# Patient Record
Sex: FEMALE | Race: BLACK OR AFRICAN AMERICAN | NOT HISPANIC OR LATINO | Employment: UNEMPLOYED | URBAN - METROPOLITAN AREA
[De-identification: names, ages, dates, MRNs, and addresses within clinical notes are randomized per-mention and may not be internally consistent; named-entity substitution may affect disease eponyms.]

---

## 2019-06-10 ENCOUNTER — TELEPHONE (OUTPATIENT)
Dept: ORTHOPEDICS | Facility: CLINIC | Age: 64
End: 2019-06-10

## 2019-06-10 DIAGNOSIS — M25.552 LEFT HIP PAIN: Primary | ICD-10-CM

## 2019-06-10 NOTE — TELEPHONE ENCOUNTER
Patient contacted in reference to her consult appointment with the provider on 6/11/19. Clarity on which hip will need xrays. Patient stated it is her left hip that is causing the problem.//DG

## 2019-06-11 ENCOUNTER — OFFICE VISIT (OUTPATIENT)
Dept: ORTHOPEDICS | Facility: CLINIC | Age: 64
End: 2019-06-11
Payer: MEDICARE

## 2019-06-11 ENCOUNTER — HOSPITAL ENCOUNTER (OUTPATIENT)
Dept: RADIOLOGY | Facility: HOSPITAL | Age: 64
Discharge: HOME | End: 2019-06-11
Attending: ORTHOPAEDIC SURGERY
Payer: MEDICARE

## 2019-06-11 VITALS
HEIGHT: 66 IN | HEART RATE: 71 BPM | SYSTOLIC BLOOD PRESSURE: 136 MMHG | WEIGHT: 293 LBS | BODY MASS INDEX: 47.09 KG/M2 | DIASTOLIC BLOOD PRESSURE: 80 MMHG

## 2019-06-11 DIAGNOSIS — E66.01 MORBID OBESITY WITH BMI OF 50.0-59.9, ADULT: ICD-10-CM

## 2019-06-11 DIAGNOSIS — M25.552 LEFT HIP PAIN: ICD-10-CM

## 2019-06-11 DIAGNOSIS — M54.5 LOW BACK PAIN, UNSPECIFIED BACK PAIN LATERALITY, UNSPECIFIED CHRONICITY, WITH SCIATICA PRESENCE UNSPECIFIED: ICD-10-CM

## 2019-06-11 DIAGNOSIS — M16.12 PRIMARY OSTEOARTHRITIS OF LEFT HIP: Primary | ICD-10-CM

## 2019-06-11 PROCEDURE — 73502 XR HIP 2 VIEW LEFT: ICD-10-PCS | Mod: 26,LT,, | Performed by: RADIOLOGY

## 2019-06-11 PROCEDURE — 73502 X-RAY EXAM HIP UNI 2-3 VIEWS: CPT | Mod: 26,LT,, | Performed by: RADIOLOGY

## 2019-06-11 PROCEDURE — 99213 OFFICE O/P EST LOW 20 MIN: CPT | Mod: PBBFAC,25 | Performed by: ORTHOPAEDIC SURGERY

## 2019-06-11 PROCEDURE — 97110 THERAPEUTIC EXERCISES: CPT | Mod: GP,,, | Performed by: ORTHOPAEDIC SURGERY

## 2019-06-11 PROCEDURE — 99999 PR PBB SHADOW E&M-EST. PATIENT-LVL III: CPT | Mod: PBBFAC,,, | Performed by: ORTHOPAEDIC SURGERY

## 2019-06-11 PROCEDURE — 99204 OFFICE O/P NEW MOD 45 MIN: CPT | Mod: 25,S$PBB,, | Performed by: ORTHOPAEDIC SURGERY

## 2019-06-11 PROCEDURE — 99999 PR PBB SHADOW E&M-EST. PATIENT-LVL III: ICD-10-PCS | Mod: PBBFAC,,, | Performed by: ORTHOPAEDIC SURGERY

## 2019-06-11 PROCEDURE — 97110 PR THERAPEUTIC EXERCISES: ICD-10-PCS | Mod: GP,,, | Performed by: ORTHOPAEDIC SURGERY

## 2019-06-11 PROCEDURE — 99204 PR OFFICE/OUTPT VISIT, NEW, LEVL IV, 45-59 MIN: ICD-10-PCS | Mod: 25,S$PBB,, | Performed by: ORTHOPAEDIC SURGERY

## 2019-06-11 PROCEDURE — 73502 X-RAY EXAM HIP UNI 2-3 VIEWS: CPT | Mod: TC,LT

## 2019-06-11 RX ORDER — ERGOCALCIFEROL 1.25 MG/1
CAPSULE ORAL
COMMUNITY
End: 2019-11-05

## 2019-06-11 RX ORDER — TRAMADOL HYDROCHLORIDE 50 MG/1
TABLET ORAL
Refills: 4 | COMMUNITY
Start: 2019-05-20 | End: 2019-11-05

## 2019-06-11 RX ORDER — MELOXICAM 7.5 MG/1
TABLET ORAL
COMMUNITY
Start: 2018-10-23 | End: 2019-11-05

## 2019-06-11 RX ORDER — DICLOFENAC SODIUM 10 MG/G
GEL TOPICAL
COMMUNITY

## 2019-06-11 RX ORDER — OXYBUTYNIN CHLORIDE 10 MG/1
TABLET, EXTENDED RELEASE ORAL
COMMUNITY
Start: 2018-10-23

## 2019-06-11 NOTE — H&P (VIEW-ONLY)
Subjective:     Patient ID: Archana Perez is a 64 y.o. female.    Chief Complaint: Pain of the Left Hip    Left hip, groin pain    Hip Pain    The pain is present in the left hip. The current episode started more than 1 month ago. The injury was the result of a action while at home. The problem occurs constantly. The problem has been gradually worsening. The quality of the pain is described as aching, shooting and sharp. The pain is at a severity of 9/10. Associated symptoms include a limited range of motion and stiffness. Pertinent negatives include no fever or itching. The symptoms are aggravated by activity, bearing weight, walking, standing and bending. She has tried nothing for the symptoms. Physical therapy was not tried.      No past medical history on file.  No past surgical history on file.  No family history on file.  Social History     Socioeconomic History    Marital status: Single     Spouse name: Not on file    Number of children: Not on file    Years of education: Not on file    Highest education level: Not on file   Occupational History    Not on file   Social Needs    Financial resource strain: Not on file    Food insecurity:     Worry: Not on file     Inability: Not on file    Transportation needs:     Medical: Not on file     Non-medical: Not on file   Tobacco Use    Smoking status: Never Smoker    Smokeless tobacco: Never Used   Substance and Sexual Activity    Alcohol use: Never     Frequency: Never    Drug use: Never    Sexual activity: Not on file   Lifestyle    Physical activity:     Days per week: Not on file     Minutes per session: Not on file    Stress: Not on file   Relationships    Social connections:     Talks on phone: Not on file     Gets together: Not on file     Attends Synagogue service: Not on file     Active member of club or organization: Not on file     Attends meetings of clubs or organizations: Not on file     Relationship status: Not on file   Other Topics  Concern    Not on file   Social History Narrative    Not on file       Review of patient's allergies indicates:   Allergen Reactions    Lortab [hydrocodone-acetaminophen] Itching     Review of Systems   Constitution: Negative for fever.   HENT: Negative for sore throat.    Eyes: Negative for blurred vision.   Cardiovascular: Negative for dyspnea on exertion.   Respiratory: Negative for shortness of breath.    Hematologic/Lymphatic: Does not bruise/bleed easily.   Skin: Negative for itching.   Musculoskeletal: Positive for stiffness.   Gastrointestinal: Negative for vomiting.   Genitourinary: Negative for dysuria.   Neurological: Negative for dizziness.   Psychiatric/Behavioral: The patient does not have insomnia.        Objective:   Body mass index is 54.88 kg/m².  Vitals:    06/11/19 1446   BP: 136/80   Pulse: 71           General    Nursing note and vitals reviewed.  Constitutional: She is oriented to person, place, and time. She appears well-developed. No distress.   HENT:   Head: Normocephalic and atraumatic.   Eyes: EOM are normal.   Cardiovascular: Normal rate.    Pulmonary/Chest: Effort normal. No stridor. No respiratory distress.   Neurological: She is alert and oriented to person, place, and time.   Psychiatric: She has a normal mood and affect. Her behavior is normal.     General Musculoskeletal Exam   Gait: antalgic         Right Hip Exam     Range of Motion   Flexion: 90   External rotation: 40   Internal rotation: 15     Tests   Pain w/ forced internal rotation (YASMANY): absent  Pain w/ forced external rotation (FADIR): absent  Log Roll: negative    Other   Sensation: normal  Left Hip Exam     Inspection   No deformity of hip.  Erythema: absent    Range of Motion   Flexion: 90   External rotation: 30   Internal rotation: 5     Tests   Pain w/ forced internal rotation (YASMANY): present  Pain w/ forced external rotation (FADIR): present  Log Roll: negative    Other   Sensation: normal          Vascular  Exam       Capillary Refill  Right Hand: normal capillary refill  Left Hand: normal capillary refill      IMAGING Radiographs / Imaging : Results reviewed by me and interpreted by me, discussed with the patient and / or family   AP pelvis and left hip views demonstrate moderate DJD left hip joint (narrowing of left vs right joint space), lumbar DJD although non lumbar films, mild to moderate osteophyte formation    Assessment:     Encounter Diagnoses   Name Primary?    Left hip pain     Primary osteoarthritis of left hip Yes    Low back pain, unspecified back pain laterality, unspecified chronicity, with sciatica presence unspecified     Morbid obesity with BMI of 50.0-59.9, adult         Plan:       I had an in depth discussion today with Archana today regarding her left hip problem, going over her radiographs and the model to help further her understanding. I explained the anatomy and pathophysiology of the problem. I told Archana  that I believe the problem relates to above noted diagnoses . We had an in depth discussion regarding appropriate treatment and management of her condition.     From a treatment standpoint, the decision was made to go forward with :     -weight loss  -cane in R hand for L hip problem  -HEP 72395 - I, instructed and demonstrated a left hip abduction strengthening HEP. The patient then demonstrated understanding of exercises and proper technique. This program was performed for 15 minutes.   -would like to see if Pain Management service / Dr. Rossi would be willing to try potentially diagnostic therapeutic left hip joint CSI with xray guidance     Follow up in 3 months with joint service

## 2019-06-11 NOTE — PROGRESS NOTES
Subjective:     Patient ID: Archana Perez is a 64 y.o. female.    Chief Complaint: Pain of the Left Hip    Left hip, groin pain    Hip Pain    The pain is present in the left hip. The current episode started more than 1 month ago. The injury was the result of a action while at home. The problem occurs constantly. The problem has been gradually worsening. The quality of the pain is described as aching, shooting and sharp. The pain is at a severity of 9/10. Associated symptoms include a limited range of motion and stiffness. Pertinent negatives include no fever or itching. The symptoms are aggravated by activity, bearing weight, walking, standing and bending. She has tried nothing for the symptoms. Physical therapy was not tried.      No past medical history on file.  No past surgical history on file.  No family history on file.  Social History     Socioeconomic History    Marital status: Single     Spouse name: Not on file    Number of children: Not on file    Years of education: Not on file    Highest education level: Not on file   Occupational History    Not on file   Social Needs    Financial resource strain: Not on file    Food insecurity:     Worry: Not on file     Inability: Not on file    Transportation needs:     Medical: Not on file     Non-medical: Not on file   Tobacco Use    Smoking status: Never Smoker    Smokeless tobacco: Never Used   Substance and Sexual Activity    Alcohol use: Never     Frequency: Never    Drug use: Never    Sexual activity: Not on file   Lifestyle    Physical activity:     Days per week: Not on file     Minutes per session: Not on file    Stress: Not on file   Relationships    Social connections:     Talks on phone: Not on file     Gets together: Not on file     Attends Denominational service: Not on file     Active member of club or organization: Not on file     Attends meetings of clubs or organizations: Not on file     Relationship status: Not on file   Other Topics  Concern    Not on file   Social History Narrative    Not on file       Review of patient's allergies indicates:   Allergen Reactions    Lortab [hydrocodone-acetaminophen] Itching     Review of Systems   Constitution: Negative for fever.   HENT: Negative for sore throat.    Eyes: Negative for blurred vision.   Cardiovascular: Negative for dyspnea on exertion.   Respiratory: Negative for shortness of breath.    Hematologic/Lymphatic: Does not bruise/bleed easily.   Skin: Negative for itching.   Musculoskeletal: Positive for stiffness.   Gastrointestinal: Negative for vomiting.   Genitourinary: Negative for dysuria.   Neurological: Negative for dizziness.   Psychiatric/Behavioral: The patient does not have insomnia.        Objective:   Body mass index is 54.88 kg/m².  Vitals:    06/11/19 1446   BP: 136/80   Pulse: 71           General    Nursing note and vitals reviewed.  Constitutional: She is oriented to person, place, and time. She appears well-developed. No distress.   HENT:   Head: Normocephalic and atraumatic.   Eyes: EOM are normal.   Cardiovascular: Normal rate.    Pulmonary/Chest: Effort normal. No stridor. No respiratory distress.   Neurological: She is alert and oriented to person, place, and time.   Psychiatric: She has a normal mood and affect. Her behavior is normal.     General Musculoskeletal Exam   Gait: antalgic         Right Hip Exam     Range of Motion   Flexion: 90   External rotation: 40   Internal rotation: 15     Tests   Pain w/ forced internal rotation ([place]AS[place]Y): absent  Pain w/ forced external rotation (FADIR): absent  Log Roll: negative    Other   Sensation: normal  Left Hip Exam     Inspection   No deformity of hip.  Erythema: absent    Range of Motion   Flexion: 90   External rotation: 30   Internal rotation: 5     Tests   Pain w/ forced internal rotation ([place]AS[place]): present  Pain w/ forced external rotation (FADIR): present  Log Roll: negative    Other   Sensation: normal          Vascular  Exam       Capillary Refill  Right Hand: normal capillary refill  Left Hand: normal capillary refill      IMAGING Radiographs / Imaging : Results reviewed by me and interpreted by me, discussed with the patient and / or family   AP pelvis and left hip views demonstrate moderate DJD left hip joint (narrowing of left vs right joint space), lumbar DJD although non lumbar films, mild to moderate osteophyte formation    Assessment:     Encounter Diagnoses   Name Primary?    Left hip pain     Primary osteoarthritis of left hip Yes    Low back pain, unspecified back pain laterality, unspecified chronicity, with sciatica presence unspecified     Morbid obesity with BMI of 50.0-59.9, adult         Plan:       I had an in depth discussion today with Archana today regarding her left hip problem, going over her radiographs and the model to help further her understanding. I explained the anatomy and pathophysiology of the problem. I told Archana  that I believe the problem relates to above noted diagnoses . We had an in depth discussion regarding appropriate treatment and management of her condition.     From a treatment standpoint, the decision was made to go forward with :     -weight loss  -cane in R hand for L hip problem  -HEP 85650 - I, instructed and demonstrated a left hip abduction strengthening HEP. The patient then demonstrated understanding of exercises and proper technique. This program was performed for 15 minutes.   -would like to see if Pain Management service / Dr. Rossi would be willing to try potentially diagnostic therapeutic left hip joint CSI with xray guidance     Follow up in 3 months with joint service

## 2019-06-12 ENCOUNTER — TELEPHONE (OUTPATIENT)
Dept: PAIN MEDICINE | Facility: CLINIC | Age: 64
End: 2019-06-12

## 2019-06-12 DIAGNOSIS — M25.552 LEFT HIP PAIN: Primary | ICD-10-CM

## 2019-06-12 NOTE — LETTER
Pain Management Pre-Procedure Instructions  (also available in your [employer] account)    Patient Name:___Archana Perez____MRN: [id number] you are scheduled to have the following procedure:__xray guided left hip injection   _with______Hector Rossi MD on: _Thursdsay 6/27/2019 at:   [facility]    PLEASE CHECK IN TO OUTPATIENT REGISTRATION AT         Day of Procedure   Ensure you have obtained arrival time from the Pain Management department  o We will call 48 hours in advance with your arrival time. Please check any voicemails you may have  o If you arrive past your scheduled procedure time, you may be asked to reschedule your procedure.   For your safety, ensure you have a  with you to remain present throughout your procedure   o If you arrive without a responsible adult to stay with you and drive you home, you may be asked to reschedule your procedure   Take all of your prescribed medications (exceptions noted below) with a small amount of water  o [] Nothing by mouth two (2) hours before your procedure.  It is ok to take your regular medications with a small sip of water.  o [x] Nothing by mouth after midnight the night before your procedure.  It is ok to take your regular medications with a small sip of water.     Wear loose, comfortable clothing    You may wear glasses, dentures, contact lenses and/or hearing aids. Please leave all valuable items at home.   Contact the Pain Management department at [phone] or via [employer] if you are:  o Running a fever above 100 degrees  o Feel ill, have any type of infection, or are taking antibiotics now or have in the past 2 weeks  o Have had any outpatient procedures in the past 2 weeks (colonoscopy, major dental work, etc.)  o If you are allergic to iodine, IVP dye or shellfish.      Contact Information: [phone], ask to speak to the pain management department with any questions or concerns or send a message via [employer]

## 2019-06-12 NOTE — TELEPHONE ENCOUNTER
----- Message from Hector Rossi MD sent at 6/12/2019 11:32 AM CDT -----  I put in the order and case request  for the let hip injection.    ----- Message -----  From: Marciano Bustos MA  Sent: 6/11/2019   4:15 PM  To: Hector Rossi MD        ----- Message -----  From: Cassandra Chase LPN  Sent: 6/11/2019   4:03 PM  To: Enid Young Staff    Good afternoon,     Dr. Somers would like for this pt to to be scheduled for a xray guided left hip injection.     Thanks!

## 2019-06-26 ENCOUNTER — TELEPHONE (OUTPATIENT)
Dept: ORTHOPEDICS | Facility: CLINIC | Age: 64
End: 2019-06-26

## 2019-06-26 DIAGNOSIS — M25.552 LEFT HIP PAIN: Primary | ICD-10-CM

## 2019-06-26 NOTE — TELEPHONE ENCOUNTER
Spoke with pt letting her know that order for wide cane is now in. Pt verbalized understanding.

## 2019-06-26 NOTE — TELEPHONE ENCOUNTER
----- Message from Orestes Pena sent at 6/26/2019 10:32 AM CDT -----  Contact: self [phone]  Pt would like to follow-up with nurse regarding order for walking cane; pt states she received call from equipment company confirming order for regular walking cane, but pt states 4 prong cane should have been ordered.  Please call back at [phone].  Joy Li

## 2019-06-27 ENCOUNTER — HOSPITAL ENCOUNTER (OUTPATIENT)
Facility: HOSPITAL | Age: 64
Discharge: HOME | End: 2019-06-27
Attending: ANESTHESIOLOGY | Admitting: ANESTHESIOLOGY
Payer: MEDICARE

## 2019-06-27 VITALS
TEMPERATURE: 98 F | DIASTOLIC BLOOD PRESSURE: 81 MMHG | SYSTOLIC BLOOD PRESSURE: 131 MMHG | WEIGHT: 293 LBS | HEIGHT: 66 IN | RESPIRATION RATE: 16 BRPM | OXYGEN SATURATION: 98 % | BODY MASS INDEX: 47.09 KG/M2 | HEART RATE: 62 BPM

## 2019-06-27 DIAGNOSIS — M25.552 LEFT HIP PAIN: ICD-10-CM

## 2019-06-27 PROCEDURE — 25000003 PHARM REV CODE 250: Performed by: ANESTHESIOLOGY

## 2019-06-27 PROCEDURE — 99152 PR MOD CONSCIOUS SEDATION, SAME PHYS, 5+ YRS, FIRST 15 MIN: ICD-10-PCS | Mod: ,,, | Performed by: ANESTHESIOLOGY

## 2019-06-27 PROCEDURE — 77002 PR FLUOROSCOPIC GUIDANCE NEEDLE PLACEMENT: ICD-10-PCS | Mod: 26,,, | Performed by: ANESTHESIOLOGY

## 2019-06-27 PROCEDURE — 20610 PR DRAIN/INJECT LARGE JOINT/BURSA: ICD-10-PCS | Mod: LT,,, | Performed by: ANESTHESIOLOGY

## 2019-06-27 PROCEDURE — 20610 DRAIN/INJ JOINT/BURSA W/O US: CPT | Mod: LT,,, | Performed by: ANESTHESIOLOGY

## 2019-06-27 PROCEDURE — 25500020 PHARM REV CODE 255: Performed by: ANESTHESIOLOGY

## 2019-06-27 PROCEDURE — 63600175 PHARM REV CODE 636 W HCPCS: Performed by: ANESTHESIOLOGY

## 2019-06-27 PROCEDURE — 99152 MOD SED SAME PHYS/QHP 5/>YRS: CPT | Mod: ,,, | Performed by: ANESTHESIOLOGY

## 2019-06-27 PROCEDURE — 20610 DRAIN/INJ JOINT/BURSA W/O US: CPT | Performed by: ANESTHESIOLOGY

## 2019-06-27 PROCEDURE — 77002 NEEDLE LOCALIZATION BY XRAY: CPT | Mod: 26,,, | Performed by: ANESTHESIOLOGY

## 2019-06-27 RX ORDER — MIDAZOLAM HYDROCHLORIDE 1 MG/ML
INJECTION, SOLUTION INTRAMUSCULAR; INTRAVENOUS
Status: DISCONTINUED | OUTPATIENT
Start: 2019-06-27 | End: 2019-06-27 | Stop reason: HOSPADM

## 2019-06-27 RX ORDER — FENTANYL CITRATE 50 UG/ML
INJECTION, SOLUTION INTRAMUSCULAR; INTRAVENOUS
Status: DISCONTINUED | OUTPATIENT
Start: 2019-06-27 | End: 2019-06-27 | Stop reason: HOSPADM

## 2019-06-27 RX ORDER — CYCLOBENZAPRINE HCL 10 MG
10 TABLET ORAL 2 TIMES DAILY PRN
Qty: 60 TABLET | Refills: 0 | Status: SHIPPED | OUTPATIENT
Start: 2019-06-27 | End: 2019-07-27

## 2019-06-27 RX ORDER — METHYLPREDNISOLONE ACETATE 40 MG/ML
INJECTION, SUSPENSION INTRA-ARTICULAR; INTRALESIONAL; INTRAMUSCULAR; SOFT TISSUE
Status: DISCONTINUED | OUTPATIENT
Start: 2019-06-27 | End: 2019-06-27 | Stop reason: HOSPADM

## 2019-06-27 RX ORDER — LIDOCAINE HYDROCHLORIDE 20 MG/ML
INJECTION, SOLUTION INFILTRATION; PERINEURAL
Status: DISCONTINUED | OUTPATIENT
Start: 2019-06-27 | End: 2019-06-27 | Stop reason: HOSPADM

## 2019-06-27 NOTE — DISCHARGE SUMMARY
[facility]  Discharge Note       Description of Procedure: Left  Acetabulofemoral Joint Injection under Fluoroscopic Guidance    Procedure Date: 6/27/2019    Admit Date: 6/27/2019  Discharge Date: 6/27/2019     Attending Physician: Enid Young   Discharge Provider: Enid Young    Preoperative Diagnosis: Acetabulofemoral Osteoarthritis     Postoperative Diagnosis: as above, same as preoperative diagnosis    Discharged Condition: Stable    Hospital Course: Patient was admitted for an outpatient procedure. The procedure was tolerated well with no complications.    Final Diagnoses: Same as principal problem.    Disposition: Home, self-care.    Follow up/Patient Instructions:  Follow-up in clinic in 2-3 weeks.    Medications: No medications were prescribed today. The patient was advised to resume normal medication regimen without change.  Specific information was provided regarding restarting any anticoagulant/s.    Discharge Procedure Orders (must include Diet, Follow-up, Activity):  Light activity for the remainder of the day, resume normal activity tomorrow. Resume normal diet. Follow-up in clinic in 2-3 weeks.

## 2019-06-27 NOTE — OP NOTE
Procedure: Hip (acetabulofemoral) joint injection under fluoroscopic guidance    Side: Left    Pre-procedure diagnosis: osteoarthritis of the hip (of the above noted laterality)  Post-procedure diagnosis: same    PROVIDER: Hector Rossi MD  Assistant(s): None    ANESTHESIA: Local, IV Sedation    >> 1 mg of VERSED    >> 50 mcg of FENTANYL     INDICATION: The patient has hip pain unresponsive to conservative treatments. Fluoroscopy was used to optimize visualization of needle placement and to maximize safety.     Description of Procedure:  Prior to starting this procedure, risks, benefits, complications, and alternatives were discussed with the patient. The patient agreed to proceed with the procedure and signed a consent. The site and side of the procedure was identified and marked. The patient was taken to the procedural suite and positioned on the table in prone orientation. A time out was performed. All in attendance were in agreement with the time out. The area over the above noted joint/s was widely prepped with ChloraPrep and drapped in usual sterile fashion.    The above noted joint/s was identified on fluoroscopy. A 27-gauge 1.5 inch needle was used to localize the site of entry with 3 mL of 1% PF Lidocaine. A 22 gauge 5 inch spinal needle was then introduced and advanced towards the neck of the femur. The target site was approximately 0.5 to 1.0 cm distal to the lateral border of the femoral head and 0.5 to 1.0 cm below the superior aspect of the femoral neck. The needle was advanced until osseus interface was met. Following negative aspiration, a solution containing 2 mL of 1% PF Lidocaine and 1 mL of Methylprednisolone (40 mg/mL) was then injected. There was minimal resistance encountered throughout injection. No paresthesias were noted on injection. The needle stylet was replaced and the needle was removed intact. The patient tolerated the procedure well. A bandage was applied to the site of  injection.    Description of Findings: Not applicable    Prosthetic devices, grafts, tissues, or devices implanted: None    Specimen Removed: No    Estimated Blood Loss: minimal    COMPLICATIONS: None    DISPOSITION / PLANS: The patient was transferred to the recovery area in a stable condition for observation. The patient was reexamined prior to discharge. There was no evidence of acute neurologic injury following the procedure.  Patient was discharged from the recovery room after meeting discharge criteria. Home discharge instructions were given to the patient by the staff.

## 2019-06-27 NOTE — PLAN OF CARE
Problem: Adult Inpatient Plan of Care  Goal: Plan of Care Review  Outcome: Outcome(s) achieved Date Met: 06/27/19  Patient d/c home in stable condition via wheelchair with ride. Verbalized understanding of d/c instructions. Patient voiced no complaints. Patient stood at side of bed, walked steps with no new motor deficits. Neuro intact.

## 2019-06-27 NOTE — DISCHARGE INSTRUCTIONS

## 2019-07-12 ENCOUNTER — TELEPHONE (OUTPATIENT)
Dept: ORTHOPEDICS | Facility: CLINIC | Age: 64
End: 2019-07-12

## 2019-07-15 ENCOUNTER — TELEPHONE (OUTPATIENT)
Dept: ORTHOPEDICS | Facility: CLINIC | Age: 64
End: 2019-07-15

## 2019-07-24 ENCOUNTER — TELEPHONE (OUTPATIENT)
Dept: ORTHOPEDICS | Facility: CLINIC | Age: 64
End: 2019-07-24

## 2019-07-24 NOTE — TELEPHONE ENCOUNTER
Spoke with the patient about their cane. Informed the patient that someone from our DME department will be giving them a call sometime this afternoon. Patient verbalized understanding. Patient was thankful for the call.FP          ----- Message from Sherrie Ray sent at 7/24/2019  3:15 PM CDT -----  Type:  Needs Medical Advice    Who Called:  Pt  Archana  Symptoms (please be specific):   Left hip pain   How long has patient had these symptoms:     Pharmacy name and phone #:     Would the patient rather a call back or a response via [facility]?  Call back  Best Call Back Number:   [phone]  Additional Information:  States she had an appt with Dr Somers in June and is calling because a cane was suppose to be ordered for her//a cane for her to pull up on//she has not heard anything//please call//jose j/lh

## 2019-07-31 ENCOUNTER — TELEPHONE (OUTPATIENT)
Dept: ORTHOPEDICS | Facility: CLINIC | Age: 64
End: 2019-07-31

## 2019-07-31 NOTE — TELEPHONE ENCOUNTER
----- Message from Fátima Palafox sent at 7/31/2019 11:30 AM CDT -----  Contact: self  states that she still hasn't received cane, been a month. please advise..[phone] (home)

## 2019-07-31 NOTE — TELEPHONE ENCOUNTER
Spoke with pt letting her know that I have sent a message to our [employer] company and someone will reach out to her regarding the cane.   Pt verbalized understanding.

## 2019-10-11 ENCOUNTER — OFFICE VISIT (OUTPATIENT)
Dept: OBSTETRICS AND GYNECOLOGY | Facility: CLINIC | Age: 64
End: 2019-10-11
Payer: MEDICARE

## 2019-10-11 VITALS
DIASTOLIC BLOOD PRESSURE: 70 MMHG | SYSTOLIC BLOOD PRESSURE: 130 MMHG | WEIGHT: 293 LBS | BODY MASS INDEX: 47.09 KG/M2 | HEIGHT: 66 IN

## 2019-10-11 DIAGNOSIS — R10.2 PELVIC PAIN IN FEMALE: ICD-10-CM

## 2019-10-11 DIAGNOSIS — D21.9 FIBROID: ICD-10-CM

## 2019-10-11 DIAGNOSIS — Z01.419 ENCOUNTER FOR GYNECOLOGICAL EXAMINATION WITHOUT ABNORMAL FINDING: Primary | ICD-10-CM

## 2019-10-11 PROCEDURE — 88141 LIQUID-BASED PAP SMEAR, SCREENING: ICD-10-PCS | Mod: ,,, | Performed by: PATHOLOGY

## 2019-10-11 PROCEDURE — G0101 CA SCREEN;PELVIC/BREAST EXAM: HCPCS | Mod: S$PBB,,, | Performed by: NURSE PRACTITIONER

## 2019-10-11 PROCEDURE — 99213 OFFICE O/P EST LOW 20 MIN: CPT | Mod: PBBFAC | Performed by: NURSE PRACTITIONER

## 2019-10-11 PROCEDURE — 99999 PR PBB SHADOW E&M-EST. PATIENT-LVL III: ICD-10-PCS | Mod: PBBFAC,,, | Performed by: NURSE PRACTITIONER

## 2019-10-11 PROCEDURE — 88175 CYTOPATH C/V AUTO FLUID REDO: CPT | Performed by: PATHOLOGY

## 2019-10-11 PROCEDURE — 99999 PR PBB SHADOW E&M-EST. PATIENT-LVL III: CPT | Mod: PBBFAC,,, | Performed by: NURSE PRACTITIONER

## 2019-10-11 PROCEDURE — G0101 CA SCREEN;PELVIC/BREAST EXAM: HCPCS | Mod: PBBFAC | Performed by: NURSE PRACTITIONER

## 2019-10-11 PROCEDURE — 88141 CYTOPATH C/V INTERPRET: CPT | Mod: ,,, | Performed by: PATHOLOGY

## 2019-10-11 PROCEDURE — G0101 PR CA SCREEN;PELVIC/BREAST EXAM: ICD-10-PCS | Mod: S$PBB,,, | Performed by: NURSE PRACTITIONER

## 2019-10-11 RX ORDER — HYDROCODONE BITARTRATE AND ACETAMINOPHEN 7.5; 325 MG/1; MG/1
TABLET ORAL
Refills: 0 | COMMUNITY
Start: 2019-08-03 | End: 2019-11-05

## 2019-10-11 RX ORDER — HYDROCODONE BITARTRATE AND ACETAMINOPHEN 10; 325 MG/1; MG/1
TABLET ORAL
Refills: 0 | COMMUNITY
Start: 2019-09-13

## 2019-10-11 RX ORDER — HYDROCODONE BITARTRATE AND ACETAMINOPHEN 10; 325 MG/1; MG/1
TABLET ORAL
COMMUNITY
Start: 2019-08-29 | End: 2019-11-05

## 2019-10-11 RX ORDER — CYCLOBENZAPRINE HCL 10 MG
10 TABLET ORAL 3 TIMES DAILY PRN
COMMUNITY

## 2019-10-11 RX ORDER — IBUPROFEN 800 MG/1
TABLET ORAL
COMMUNITY
Start: 2019-10-04

## 2019-10-11 NOTE — PROGRESS NOTES
CC: Well woman exam    Archana Perez is a 64 y.o. female No obstetric history on file. presents for well woman exam.  Has been having some left hip pain, had MRI done in 3/2019 showed 6.2 cm intramural fibroid left lateral wall of uterus.   Pt states has pelvic pain/ pressure off and on. /she has noted some blood on her pad she wears since having MRI in March.  Nothing in recent past.    Has not seen gyn in about 3 years, mmg done at [facility] in September.      LMP: No LMP recorded. Patient is postmenopausal..        History reviewed. No pertinent past medical history.     Past Surgical History:   Procedure Laterality Date    INJECTION OF JOINT Left 6/27/2019    Procedure: Injection, Joint Left Hip Injection;  Surgeon: Hector Rossi MD;  Location: [facility];  Service: Pain Management;  Laterality: Left;     Social History     Socioeconomic History    Marital status: Single     Spouse name: Not on file    Number of children: Not on file    Years of education: Not on file    Highest education level: Not on file   Occupational History    Not on file   Social Needs    Financial resource strain: Not on file    Food insecurity:     Worry: Not on file     Inability: Not on file    Transportation needs:     Medical: Not on file     Non-medical: Not on file   Tobacco Use    Smoking status: Never Smoker    Smokeless tobacco: Never Used   Substance and Sexual Activity    Alcohol use: Never     Frequency: Never    Drug use: Never    Sexual activity: Not Currently   Lifestyle    Physical activity:     Days per week: Not on file     Minutes per session: Not on file    Stress: Not on file   Relationships    Social connections:     Talks on phone: Not on file     Gets together: Not on file     Attends Anglican service: Not on file     Active member of club or organization: Not on file     Attends meetings of clubs or organizations: Not on file     Relationship status: Not on file   Other Topics Concern    Not on  "file   Social History Narrative    Not on file     History reviewed. No pertinent family history.  OB History    None         /70   Ht 5' 6" (1.676 m)   Wt (!) 152.5 kg (336 lb 3.2 oz)   BMI 54.26 kg/m²       ROS:  Per hpi    PHYSICAL EXAM:  APPEARANCE: Well nourished, well developed, in no acute distress.  AFFECT: WNL, alert and oriented x 3  SKIN: No acne or hirsutism  PELVIC: Normal external genitalia without lesions.  Normal hair distribution.  Adequate perineal body, normal urethral meatus.  Vagina moist and well rugated without lesions or discharge.  Cervix pink, without lesions, discharge or tenderness.  No significant cystocele or rectocele.  Bimanual exam difficult due to body habitus but shows uterus to be normal size, regular, mobile and nontender.  Adnexa without masses or tenderness.    EXTREMITIES: difficulty with ambulation due to left hip issues but was able to get on bed and do exam     1. Encounter for gynecological examination without abnormal finding  Liquid-based pap smear, screening   2. Pelvic pain in female  US Pelvis Comp with Transvag NON-OB (xpd   3. Fibroid  US Pelvis Comp with Transvag NON-OB (xpd    AND PLAN:    Patient was counseled today on A.C.S. Pap guidelines and recommendations for yearly pelvic exams, mammograms and monthly self breast exams; to see her PCP for other health maintenance.       Check u/s and have pt f/u with gyn MD           "

## 2019-10-11 NOTE — PATIENT INSTRUCTIONS
What Are Fibroids?  Fibroids are growths made up of connective tissue and muscle cells that usually form in the wall of your uterus. Other names for fibroids are myomas and leiomyomas. Fibroids are the most common tumor in women. They are almost always noncancerous (benign) and harmless. Fibroids start as pea-sized lumps, but can grow steadily during your reproductive years. Many fibroids just need to be watched. Others may need treatment if they become too large or cause symptoms.    Potential problems  Fibroids often cause no symptoms. But a fibroid that grows quickly in your uterus can cause 1 or more of the following problems:  · Excessive uterine bleeding, leading to anemia (lack of red blood cells)  · Frequent urge to urinate  · Difficulty having bowel movements  · Achiness, heaviness, or fullness  · Back or abdominal pain  · Pain during intercourse  · Difficulty getting pregnant or being unable to get pregnant  · Problems with pregnancy  · Enlargement of the lower abdomen  Treatment is tailored for you  No 2 fibroids are the same. The type of treatment you will have depends on their number, size, location, and rate of growth. Your treatment decision also depends on the severity of your symptoms and whether or not you plan to have children in the future. There are a growing number of effective ways to treat fibroids. After your medical evaluation, your health care provider will be able to discuss with you the best options to solve your particular problem and meet your needs.  Your future checkups  Treating your fibroids is likely to relieve your symptoms. But your health care provider will want to check your progress. Ask your health care provider about any additional follow-up visits you might need.   Date Last Reviewed: 5/10/2015  © 5548-9921 [employer]. [address] [place] [address], [place] [address]. All rights reserved. This information is not intended as a substitute for professional medical  care. Always follow your healthcare professional's instructions.        Uterine Fibroids    Fibroids are lumps (growths) of muscle tissue. They can form in the wall of uterus (womb). Fibroids are very common. They are almost always benign (noncancerous). It is not known what causes fibroids. But they may run in families. Also, changes in female hormones may cause them to grow over time. After menopause, fibroids may stop growing, shrink, or go away.  Fibroids may or may not cause symptoms. This depends on many factors, such as the size, number, and locations of the fibroids. If symptoms do occur, they can include:  · Heavy bleeding (in severe cases, this may lead to a problem called anemia) or painful periods  · Feeling of fullness, pressure, or pain in the lower belly (abdomen) or pelvic region  · Lower back pain  · Frequent urination  · Constipation  · Pain during sex  · Problems getting pregnant  If fibroids are suspected, an evaluation will be done to help understand the extent of the problem. This can include a health history, exam, and tests. Based on the results, treatment can then be planned in needed.  Until more details are known about your problem, you may be given guidelines similar to the home care instructions below.      Home care  · To help control pain, over-the-counter pain medicine may be advised. Take these only as directed by your provider.  · If you have heavy or painful periods, keep a log of your menstrual cycle. Show the log to your provider. The information may help him or her determine if your symptoms are due to fibroids or another cause.  · Make certain lifestyle changes. This may include losing excess weight, being more active, or eating less red meat. These changes may help lower the risk of fibroids in some people. They may also help improve overall health.  Follow-up care  Follow up with your healthcare provider as advised. If testing was done, youll be told the results when they  are ready. Treatment options for fibroids may include medicines or procedures to help shrink or keep fibroids from growing. In severe cases, surgery may be needed to remove fibroids or to remove the uterus. If you have fibroids but no symptoms, you may not need treatment at all. However, your provider may advise regular follow-up to check fibroid size and growth.  When to seek medical advice  Call your healthcare provider right away if any of these occur:  · Heavy bleeding or painful periods that continue or dont get better with treatment  · Signs of anemia such as extreme fatigue, pale skin, shortness of breath with little exertion, or rapid heartbeat  · Weakness, dizziness, or fainting  · Severe pain in the pelvic or belly region  · Swollen or enlarged belly  Date Last Reviewed: 6/22/2015  © 9834-6223 [employer]. [address] [place] [address], [place] [address]. All rights reserved. This information is not intended as a substitute for professional medical care. Always follow your healthcare professional's instructions.

## 2019-10-29 ENCOUNTER — TELEPHONE (OUTPATIENT)
Dept: RADIOLOGY | Facility: HOSPITAL | Age: 64
End: 2019-10-29

## 2019-10-30 ENCOUNTER — HOSPITAL ENCOUNTER (OUTPATIENT)
Dept: RADIOLOGY | Facility: HOSPITAL | Age: 64
Discharge: HOME | End: 2019-10-30
Attending: NURSE PRACTITIONER
Payer: MEDICARE

## 2019-10-30 DIAGNOSIS — R10.2 PELVIC PAIN IN FEMALE: ICD-10-CM

## 2019-10-30 DIAGNOSIS — D21.9 FIBROID: ICD-10-CM

## 2019-10-30 PROCEDURE — 76830 TRANSVAGINAL US NON-OB: CPT | Mod: 26,,, | Performed by: RADIOLOGY

## 2019-10-30 PROCEDURE — 76856 US EXAM PELVIC COMPLETE: CPT | Mod: 26,,, | Performed by: RADIOLOGY

## 2019-10-30 PROCEDURE — 76830 US PELVIS COMP WITH TRANSVAG NON-OB (XPD): ICD-10-PCS | Mod: 26,,, | Performed by: RADIOLOGY

## 2019-10-30 PROCEDURE — 76830 TRANSVAGINAL US NON-OB: CPT | Mod: TC

## 2019-10-30 PROCEDURE — 76856 US PELVIS COMP WITH TRANSVAG NON-OB (XPD): ICD-10-PCS | Mod: 26,,, | Performed by: RADIOLOGY

## 2019-10-30 PROCEDURE — 76856 US EXAM PELVIC COMPLETE: CPT | Mod: TC

## 2019-11-05 ENCOUNTER — OFFICE VISIT (OUTPATIENT)
Dept: OBSTETRICS AND GYNECOLOGY | Facility: CLINIC | Age: 64
End: 2019-11-05
Payer: MEDICARE

## 2019-11-05 VITALS
HEIGHT: 65 IN | WEIGHT: 293 LBS | DIASTOLIC BLOOD PRESSURE: 80 MMHG | SYSTOLIC BLOOD PRESSURE: 128 MMHG | BODY MASS INDEX: 48.82 KG/M2

## 2019-11-05 DIAGNOSIS — D25.9 UTERINE LEIOMYOMA, UNSPECIFIED LOCATION: Primary | ICD-10-CM

## 2019-11-05 PROCEDURE — 99213 OFFICE O/P EST LOW 20 MIN: CPT | Mod: PBBFAC | Performed by: OBSTETRICS & GYNECOLOGY

## 2019-11-05 PROCEDURE — 99999 PR PBB SHADOW E&M-EST. PATIENT-LVL III: CPT | Mod: PBBFAC,,, | Performed by: OBSTETRICS & GYNECOLOGY

## 2019-11-05 PROCEDURE — 99212 OFFICE O/P EST SF 10 MIN: CPT | Mod: S$PBB,,, | Performed by: OBSTETRICS & GYNECOLOGY

## 2019-11-05 PROCEDURE — 99212 PR OFFICE/OUTPT VISIT, EST, LEVL II, 10-19 MIN: ICD-10-PCS | Mod: S$PBB,,, | Performed by: OBSTETRICS & GYNECOLOGY

## 2019-11-05 PROCEDURE — 99999 PR PBB SHADOW E&M-EST. PATIENT-LVL III: ICD-10-PCS | Mod: PBBFAC,,, | Performed by: OBSTETRICS & GYNECOLOGY

## 2019-11-05 RX ORDER — TRAZODONE HYDROCHLORIDE 50 MG/1
TABLET ORAL
COMMUNITY
Start: 2019-10-16

## 2019-11-05 NOTE — PATIENT INSTRUCTIONS
Hysterectomy: Surgical Procedures  A hysterectomy is the removal of a womans uterus. It can relieve symptoms such as severe pain and bleeding. If you have cancer, it may save your life. You will discuss what type of surgery you will have with your health care provider. This will depend on your health problem. If you have any questions or concerns, let your health care provider know.  Reaching the uterus  There are several ways to reach the uterus to remove it. The best approach depends on the reason for your surgery. The 3 main approaches are described below:  · Vaginal. An incision is made inside the vagina. The uterus is then removed through this incision. This can be done if the uterus is not too large.  · Laparoscopic. A thin tube with a camera and a light on the end is used. This is called a laparoscope. The doctor makes 2 to 4 small incisions in the abdomen. The scope is put through 1 of the incisions. The scope sends live pictures to a video screen. This allows the doctor see inside the abdomen. Surgical tools are placed through the other small incisions. The uterus can be removed through these incisions or through an incision made in the vagina. One of the following procedures may be done:  ¨ The uterus is removed through a small incision in the vagina. This is called LAVH.  ¨ The uterus is removed through the small incisions in the abdomen. The cervix is left in place. This is called LSH.  ¨ The uterus and cervix are removed through the small incisions in the abdomen. This is called TLH.  ¨ During any of these procedures, robotic technique may be used. This assists the surgeons vision and hand movements.  · Abdominal. One incision of 4 to 6 inches is made in the abdomen. The uterus is removed through this incision.   Types of Hysterectomy     Total hysterectomy       Subtotal hysterectomy       Hysterectomy with salpingo-oophorectomy      When the uterus is removed, the cervix may be left in  place. Or it may also be removed. If its removed, the top of the vagina is closed. In certain cases, the ovaries and fallopian tubes are also removed.   · Removing the uterus. During a total (simple) hysterectomy, the uterus and cervix are removed. During a subtotal hysterectomy, only the uterus is removed. The cervix is left in place. This is also called a supracervical hysterectomy. In either case, the ovaries and fallopian tubes remain. If you have not yet reached menopause, the ovaries will keep making hormones. You may still feel the changes of menstrual cycles. But you will not have periods and cant become pregnant.  · Removing the uterus, ovaries, and tubes. Along with the uterus, the ovaries and fallopian tubes may also be removed. This is called a hysterectomy with salpingo-oophorectomy. It causes the bodys estrogen levels to drop quickly. This is called surgical menopause. Women who have not reached menopause before surgery may have sudden symptoms. But these symptoms can be treated.   Deciding on hysterectomy  You and your doctor can discuss the best options for you. As you decide, your doctor may ask you to think about the following:  · Is your health problem getting in the way of your daily life? Is the problem getting worse? If not, might other treatments be tried first?  · Do you want to have children? If so, other treatments should be considered.  · Should the fallopian tubes be removed too? This will reduce the changes of ovarian cancer since we  now know that many ovarian cancers actually from the the tubes.  · Should the ovaries be removed too? This is often done to treat or prevent cancer. If removal is needed, talk to your doctor about hormone therapy.  Risks and possible complications of a hysterectomy include  · Side effects from the anesthesia  · Infection  · Bleeding, with a possible need for transfusion  · Damage to nearby organs (bladder, bowel, ureters, or nearby nerves or blood  vessels)  · Blood clots in the legs or lungs  · Formation of scar tissue that may cause pain or bowel obstruction in the future. This is more common with the abdominal approach.  · Need for second surgery   Date Last Reviewed: 5/13/2015  © 5018-4873 [employer]. [address] [place] [address]. All rights reserved. This information is not intended as a substitute for professional medical care. Always follow your healthcare professional's instructions.        Problems That Hysterectomy Can Treat  Problems with any of the reproductive organs can disrupt your cycle, cause symptoms, or threaten your health. Some of the most common problems are shown below. A hysterectomy may also be done for other reasons.          Other problems hysterectomy can treat  Cervical dysplasia, chronic pelvic pain, abnormal uterine bleeding due to hyperplasia or adenomyosis.  Planning your treatment  After going over the results of your exam and any tests, you and your doctor will make a treatment plan. Options may include hysterectomy by itself or along with other treatments. As you create the plan, your doctor may discuss the following questions with you:  · How severe is your problem?  · Do you want to have children?  · Should the tubes or ovaries be removed too?  · Should the hysterectomy be done abdominally, laparoscopically, or vaginally?   Date Last Reviewed: 5/13/2015  © 6874-8749 [employer]. [address] [place] [address]. All rights reserved. This information is not intended as a substitute for professional medical care. Always follow your healthcare professional's instructions.

## 2019-11-05 NOTE — PROGRESS NOTES
Subjective:       Patient ID: Archana Perez is a 64 y.o. female.    Chief Complaint:  Pelvic Ultrasound Results      History of Present Illness  Patient presents as a follow up from a pelvic ultrasound ordered by the nurse practitioner.  Patient was being worked up for hip pain and had a MRI.  It was noted that she had a large fibroid on MRI.  Patient was sent to gynecology for a follow up.  Patient had an ultrasound ordered which showed a 5.9 fibroid located in the central cavity of the uterus.  Patient states she occasionally has pelvic pain, some heaviness in her vaginal area.  Patient denies cramps or post menopausal bleeding.  Patient is here to discuss her options.  Patient is currently being evaluated for a left hip transplant.          GYN & OB History  No LMP recorded. Patient is postmenopausal.   Date of Last Pap: 10/21/2019    OB History   No data available       Review of Systems      Review of Systems   Constitutional: Negative.    HENT: Negative.    Eyes: Negative.    Respiratory: Negative.    Cardiovascular: Negative.    Gastrointestinal: Negative.    Endocrine: Negative.    Genitourinary: Negative.    Musculoskeletal: Negative.    Skin: Negative.    Allergic/Immunologic: Negative.    Neurological: Negative.    Hematological: Negative.    Psychiatric/Behavioral: Negative.       Objective:    Physical Exam:   Constitutional: She is oriented to person, place, and time. She appears well-developed and well-nourished.    HENT:   Head: Normocephalic and atraumatic.    Eyes: EOM are normal.    Neck: Normal range of motion.                    Musculoskeletal: Normal range of motion.       Neurological: She is alert and oriented to person, place, and time.    Skin: Skin is warm and dry.    Psychiatric: She has a normal mood and affect.          Assessment:        1. Uterine leiomyoma, unspecified location               Plan:   Uterine leiomyoma, unspecified location  Comments:  options d/c, hysterectomy vs  myomectomy vs uterine ablation     patient feels like she wants a hysterectomy, but wants to get her hip replacement first, recover from that, and possibly lose some more weight and get her health better before having a hysterectomy.      Follow up if symptoms worsen or fail to improve.

## 2022-02-03 ENCOUNTER — TELEPHONE (OUTPATIENT)
Dept: OBSTETRICS AND GYNECOLOGY | Facility: CLINIC | Age: 67
End: 2022-02-03
Payer: MEDICARE

## 2022-02-03 NOTE — TELEPHONE ENCOUNTER
----- Message from Valeri Guerra sent at 2/3/2022  2:07 PM CST -----  Contact: Self [phone]  Patient would like to get medical advice.    Would you like a call back, or a response through your [facility] portal?:   call back    Comments:  Calling to schedule a new pt appt for a surgery consult for a tumor and bladder concerns.

## 2022-02-03 NOTE — TELEPHONE ENCOUNTER
Contacted pt to schedule an appointment. Pt confirmed appointment date/time/location and voiced understanding.